# Patient Record
Sex: MALE | Race: WHITE | Employment: FULL TIME | URBAN - METROPOLITAN AREA
[De-identification: names, ages, dates, MRNs, and addresses within clinical notes are randomized per-mention and may not be internally consistent; named-entity substitution may affect disease eponyms.]

---

## 2021-08-04 ENCOUNTER — HOSPITAL ENCOUNTER (EMERGENCY)
Age: 21
Discharge: HOME | End: 2021-08-04
Payer: COMMERCIAL

## 2021-08-04 ENCOUNTER — APPOINTMENT (OUTPATIENT)
Dept: GENERAL RADIOLOGY | Age: 21
End: 2021-08-04
Payer: COMMERCIAL

## 2021-08-04 VITALS
OXYGEN SATURATION: 100 % | BODY MASS INDEX: 26.31 KG/M2 | RESPIRATION RATE: 16 BRPM | TEMPERATURE: 98.3 F | WEIGHT: 205 LBS | SYSTOLIC BLOOD PRESSURE: 153 MMHG | HEART RATE: 85 BPM | DIASTOLIC BLOOD PRESSURE: 74 MMHG | HEIGHT: 74 IN

## 2021-08-04 DIAGNOSIS — Z23 NEED FOR TETANUS BOOSTER: ICD-10-CM

## 2021-08-04 DIAGNOSIS — R93.6 ABNORMAL X-RAY OF HAND: ICD-10-CM

## 2021-08-04 DIAGNOSIS — S61.411A LACERATION OF RIGHT HAND WITHOUT FOREIGN BODY, INITIAL ENCOUNTER: Primary | ICD-10-CM

## 2021-08-04 PROCEDURE — 12002 RPR S/N/AX/GEN/TRNK2.6-7.5CM: CPT

## 2021-08-04 PROCEDURE — 2500000003 HC RX 250 WO HCPCS: Performed by: NURSE PRACTITIONER

## 2021-08-04 PROCEDURE — 90471 IMMUNIZATION ADMIN: CPT | Performed by: NURSE PRACTITIONER

## 2021-08-04 PROCEDURE — 99284 EMERGENCY DEPT VISIT MOD MDM: CPT

## 2021-08-04 PROCEDURE — 6370000000 HC RX 637 (ALT 250 FOR IP): Performed by: NURSE PRACTITIONER

## 2021-08-04 PROCEDURE — 6360000002 HC RX W HCPCS: Performed by: NURSE PRACTITIONER

## 2021-08-04 PROCEDURE — 90715 TDAP VACCINE 7 YRS/> IM: CPT | Performed by: NURSE PRACTITIONER

## 2021-08-04 PROCEDURE — 73130 X-RAY EXAM OF HAND: CPT

## 2021-08-04 RX ORDER — BACITRACIN, NEOMYCIN, POLYMYXIN B 400; 3.5; 5 [USP'U]/G; MG/G; [USP'U]/G
OINTMENT TOPICAL 2 TIMES DAILY
Qty: 1 TUBE | Refills: 0 | Status: SHIPPED | OUTPATIENT
Start: 2021-08-04 | End: 2021-08-11

## 2021-08-04 RX ORDER — GINSENG 100 MG
CAPSULE ORAL ONCE
Status: COMPLETED | OUTPATIENT
Start: 2021-08-04 | End: 2021-08-04

## 2021-08-04 RX ORDER — LIDOCAINE HYDROCHLORIDE 10 MG/ML
5 INJECTION, SOLUTION INFILTRATION; PERINEURAL ONCE
Status: COMPLETED | OUTPATIENT
Start: 2021-08-04 | End: 2021-08-04

## 2021-08-04 RX ADMIN — TETANUS TOXOID, REDUCED DIPHTHERIA TOXOID AND ACELLULAR PERTUSSIS VACCINE, ADSORBED 0.5 ML: 5; 2.5; 8; 8; 2.5 SUSPENSION INTRAMUSCULAR at 17:45

## 2021-08-04 RX ADMIN — LIDOCAINE HYDROCHLORIDE 5 ML: 10 INJECTION, SOLUTION INFILTRATION; PERINEURAL at 17:45

## 2021-08-04 RX ADMIN — BACITRACIN: 500 OINTMENT TOPICAL at 17:45

## 2021-08-04 ASSESSMENT — PAIN SCALES - GENERAL
PAINLEVEL_OUTOF10: 4
PAINLEVEL_OUTOF10: 5

## 2021-08-04 ASSESSMENT — PAIN DESCRIPTION - PROGRESSION: CLINICAL_PROGRESSION: NOT CHANGED

## 2021-08-04 ASSESSMENT — PAIN DESCRIPTION - LOCATION: LOCATION: HAND

## 2021-08-04 ASSESSMENT — PAIN DESCRIPTION - FREQUENCY: FREQUENCY: CONTINUOUS

## 2021-08-04 ASSESSMENT — PAIN DESCRIPTION - PAIN TYPE: TYPE: ACUTE PAIN

## 2021-08-04 ASSESSMENT — PAIN DESCRIPTION - DESCRIPTORS: DESCRIPTORS: PATIENT UNABLE TO DESCRIBE

## 2021-08-04 ASSESSMENT — PAIN DESCRIPTION - ORIENTATION: ORIENTATION: RIGHT

## 2021-08-04 NOTE — ED PROVIDER NOTES
[address] [facility]  Department of Emergency Medicine   ED  Encounter Note  Admit Date/RoomTime: 2021  5:12 PM  ED Room: 34/    NAME: Osman Quach  : 2000  MRN: [id number]     Chief Complaint:  Laceration (R hand on sheet metal, needs tetanus)    History of Present Illness       Osman Quach is a 22 y.o. old male presenting to the emergency department by private vehicle, for a laceration to the right palm, caused by brand new clean sheet metal, which occurred at home approximately 1 hour(s) prior to arrival.  There is not a possibility of retained foreign body in the affected area. Bleeding is  controlled. He takes no blood thinning agents. There is pain at injury site. Tetanus Status:  more than 5 years ago. ROS   Pertinent positives and negatives are stated within HPI, all other systems reviewed and are negative. Past Medical History:  has no past medical history on file. Surgical History:  has no past surgical history on file. Social History:  reports that he has never smoked. He has never used smokeless tobacco.    Family History: family history is not on file. Allergies: Patient has no known allergies. Physical Exam   Oxygen Saturation Interpretation: Normal.        ED Triage Vitals [21 1710]   BP Temp Temp Source Pulse Resp SpO2 Height Weight   (!) 153/74 98.3 °F (36.8 °C) Temporal 85 16 100 % 6' 2\" (1.88 m) 205 lb (93 kg)         Constitutional:  Alert, development consistent with age. Neck:  Normal ROM. Supple. Non-tender. Hand: Right palm            Tenderness: mild. Swelling: Moderate. Deformity: no.             Skin: 4cm laceration. Neurovascular: Motor deficit: none. Sensory deficit: none. Pulse deficit: none. Capillary refill: normal.  Fingers:  Right all fingers diffusely            Tenderness:  none. Swelling: None. Deformity: no.              ROM: full range of motion. Skin:  no wounds, erythema, or swelling. Wrist:  Right diffusely across carpal bones             Tenderness:  none. Negative snuffbox tenderness            Swelling: None. Deformity: no.             ROM: full range of motion. Skin:  no wounds, erythema, or swelling. Lymphatics: No lymphangitis or adenopathy noted. Neurological:  Oriented. Motor functions intact. t. Lab / Imaging Results   (All laboratory and radiology results have been personally reviewed by myself)  Labs:  No results found for this visit on 08/04/21. Imaging: All Radiology results interpreted by Radiologist unless otherwise noted. XR HAND RIGHT (MIN 3 VIEWS)   Final Result   No acute osseous abnormality. ED Course / Medical Decision Making     Medications   Tetanus-Diphth-Acell Pertussis (BOOSTRIX) injection 0.5 mL (0.5 mLs Intramuscular Given 8/4/21 1745)   lidocaine 1 % injection 5 mL (5 mLs Intradermal Given by Other 8/4/21 1745)   bacitracin ointment ( Topical Given 8/4/21 1745)        Re-examination:  8/4/21       Time: 1850-reviewed x-ray of incidental finding of small cyst in the distal phalanx of the second finger. Upon examination no cyst was able to be found. Discussed follow-up with hand surgeon for further evaluation. Discussed appropriate wound care and signs symptoms of infection. Patient discharged home with Neosporin. Discussed appropriate usage and side effects of starting this medication. He states verbal understanding. Consult(s):   None    Procedure(s):      LACERATION REPAIR  PROCEDURE NOTE:  Unless otherwise indicated, this procedure was done or directly supervised by the ED attending. Laceration #: 1. Location: right palm  Length: 4cm. The wound area was cleansend with shur-clens and draped in a sterile fashion. The wound area was anesthetized with Lidocaine 1% without epinephrine.   WOUND COMPLEXITY:    Debridement: None. Undermining: None. Wound Margins Revised: None. Flaps Aligned: no. The wound was explored with the following results no foreign body or tendon injury seen. The wound was closed with 5-0 Prolene using interrupted sutures. Dressing:  bacitracin and a sterile dressing was placed. Total number suture: 10      MDM:   Patient presents to the ED for laceration to right palm by a brand-new sheet of metal. Differential diagnoses included but not limited to laceration versus abrasion. Workup in the ED revealed upon examination there is no foreign body seen. X-ray of the right hand revealed no acute osseous abnormalities. Did note a small cyst in the distal phalanx of the second finger which was discussed with patient to follow-up for further evaluation for this. There is no neurovascular compromise neurological deficits. There is no tendon injury noted. He has normal flexion extension of all fingers and hand. Wound care was completed and laceration was repaired with 10 interrupted sutures. Bacitracin and sterile dressing was placed over top. Patient continues to be non-toxic on re-evaluation. Findings were discussed with the patient and reasons to immediately return to the ED were articulated to them. They will follow-up with their PMD and hand surgeon. Plan of Care/Counseling:  LILLIE Worley CNP reviewed today's visit with the patient in addition to providing specific details for the plan of care and counseling regarding the diagnosis and prognosis. Questions are answered at this time and are agreeable with the plan. Assessment      1. Laceration of right hand without foreign body, initial encounter    2. Need for tetanus booster    3. Abnormal x-ray of hand      Plan   Discharged home.   Patient condition is stable    New Medications     Discharge Medication List as of 8/4/2021  6:54 PM      START taking these medications    Details neomycin-bacitracin-polymyxin (NEOSPORIN) 400-5-5000 ointment Apply topically 2 times daily for 7 days Apply topically 2 times daily. , Topical, 2 TIMES DAILY Starting Wed 8/4/2021, Until Wed 8/11/2021, For 7 days, Disp-1 Tube, R-0, Print           Electronically signed by LILLIE Gutierrez CNP   DD: 8/4/21  **This report was transcribed using voice recognition software. Every effort was made to ensure accuracy; however, inadvertent computerized transcription errors may be present.   END OF ED PROVIDER NOTE       LILLIE Gutierrez CNP  08/04/21 [id number]

## 2021-08-04 NOTE — ED NOTES
Bacitracin wound care, dry sterile dressing applied, instructed on care verbalized understanding     Lindsey Haile RN  08/04/21 [id number]

## 2021-08-09 ENCOUNTER — TELEPHONE (OUTPATIENT)
Dept: ORTHOPEDIC SURGERY | Age: 21
End: 2021-08-09